# Patient Record
Sex: MALE | Race: WHITE | Employment: FULL TIME
[De-identification: names, ages, dates, MRNs, and addresses within clinical notes are randomized per-mention and may not be internally consistent; named-entity substitution may affect disease eponyms.]

---

## 2018-10-10 ENCOUNTER — HOSPITAL ENCOUNTER (OUTPATIENT)
Dept: PHYSICAL THERAPY | Facility: CLINIC | Age: 33
Setting detail: THERAPIES SERIES
Discharge: HOME | End: 2018-10-10
Payer: COMMERCIAL

## 2018-10-10 PROCEDURE — 97140 MANUAL THERAPY 1/> REGIONS: CPT

## 2018-10-10 PROCEDURE — G0283 ELEC STIM OTHER THAN WOUND: HCPCS

## 2018-10-10 PROCEDURE — 97161 PT EVAL LOW COMPLEX 20 MIN: CPT

## 2018-10-10 NOTE — CONSULTS
reports history of back giving out ~1 year ago, but pain resided with use of medication and stretching regimen. PMHx: [x] Other: Asthma              [x] Refer to full medical chart  In EPIC   Tests: None    Medications: [x] Other: Ibuprofen   Allergies:      [x] Refer to full medical record [x] Other: Bee stings    Function:  Hand Dominance  [x] Right    Working: [x] Light Duty  Return to work: TBD   Job/ADL Description: [employer]     Pain:  [x] Yes  [] No Location: L side lower thoracic/upper lumbar region  Pain Rating: (0-10 scale) 3-4/10  Pain altered Tx:  [x] Yes  [] No  Action: Ended session with HP/stim due to increased irritability of pain with evaluation    Symptoms:  [x] Improving   Better:  [x] Other: Ibuprofen, slight stretching  Worse:[x] PM  [x] Bend  [x] Other: leaning in opposite direction of source of pain   Sleep: [x] OK        Objective:   STRENGTH  ROM    Left Right Lumbar    L1-2 Hip Flex 5 5 Flexion 84   Hip Abd 5 5 Extension 20   L3-4 Knee Ext 5 5 Rotation L 10% limited  R 25% limited*   L4 Ankle DF 5 5 Sidebend L 22 R 15*   L5 EHL 5 5     S1 Plant. Flex 5 5     *= provoked L sided back pain    TESTS (+/-) LEFT RIGHT Not Tested   SLR [] sit [x] supine + for LB pain + for LB pain []   Hamstring (SLR)   []   SKTC + for L LB pain + for L LB pain []   DKTC - - []   Slump/Dural - - []   SI JT - - []   Primo Tests ? Pain ?  Pain No Change Not Tested   RFIS [] [] [x] []   JM [] [] [x] []   RFIL [x] [] [] []   REIL [x] [] [] []   Rep Prot [] [] [] [x]   Rep Retract [] [] [] [x]       OBSERVATION No Deficit Deficit Not Tested Comments   Posture       Forward Head [] [x] []    Rounded Shoulders [] [x] []    Kyphosis [] [x] [] Increased kyphosis of lumbar spine   Lordosis [x] [] []    Lateral Shift [x] [] []    Scoliosis [x] [] []    Iliac Crest [x] [] []    PSIS [x] [] []    ASIS [] [] [x]    Genu Valgus [x] [] []    Genu Varus [x] [] []    Genu Recurvatum [x] [] []    Pronation [] [] [x] Supination [] [] [x]    Leg Length Discrp [] [] [x]    Slumped Sitting [] [x] [] Moderate slumped sitting posture demonstrated throughout the evaluation; patient unable to sit in static positioning for >20-30 seconds at a time   Palpation [] [x] [] TTP at bilat lower thoracic and upper lumbar paraspinal, palpable knot felt in R lower thoracic paraspinals    Sensation [x] [] []    Edema [x] [] []    Neurological [x] [] []          FUNCTION Normal Difficult Unable   Sitting [] [] []   Standing [] [x] []   Ambulation [] [x] []   Groom/Dress [] [x] []   Lift/Carry [] [x] []   Stairs [x] [] []   Bending [] [x] []   OH reach [] [x] []   Sit to Stand [] [x] []     Comments: 26% perceived deficit per Oswestry    Assessment:  Problems:    [x] ? Back Pain: 3-4/10 thoracic and lumbar spine pain     [x] ? ROM: increased pain and limited AROM with R lumbar rotation and sidebending    [x] ? Strength: decreased core stabilization strength   [x] ? Function: 26% perceived deficit per Oswestry; light duty at work  [x] Postural Deviations: slumped sitting, sacral sitting  [x] Other: TTP at lower thoracic and lumbar paraspinals, trigger point in thoracic R paraspinals     LTG: (to be met in 9 treatments)  1. ? Pain: Patient will report pain at or below 1/10 with all active lumbar ROM to demonstrate improved tolerance to functional mobility. 2. ? ROM: Patient will demonstrate WNL lumbar ROM to allow for improved ability to perform ADLs and occupational requirements with decreased pain. 3. ? Strength: Patient will demonstrate 5/5 strength in abdominal and erector spinae musculature to allow for improved core stabilization. 4. ? 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   5. Patient will demonstrate ability to perform 5 squats with proper body mechanics to decrease stress on lumbar patient noting decreased pain and stiffness after manual work    Specific Instructions for next treatment: MFR to thoracic and lumbar paraspinals, lumbar AROM/PROM, core stabilization exercises as tolerated       Evaluation Complexity:  History (Personal factors, comorbidities) [] 0 [x] 1-2 [] 3+   Exam (limitations, restrictions) [] 1-2 [x] 3 [] 4+   Clinical presentation (progression) [x] Stable [] Evolving  [] Unstable   Decision Making [x] Low [] Moderate [] High    [x] Low Complexity [] Moderate Complexity [] High Complexity       Treatment Charges: Mins Units   [x] Evaluation       [x]  Low       []  Moderate       []  High 25 1   [x]  Modalities: HP/stim 15 1   []  Ther Exercise     [x]  Manual Therapy 15 1   []  Ther Activities     []  Aquatics     []  Vasocompression     []  Other       TOTAL TREATMENT TIME: 55    Time in: 2:45 pm    Time out: 3:45 pm    Electronically signed by: Israel Herrera PT        Physician Signature:________________________________Date:__________________  By signing above or cosigning this note, I have reviewed this plan of care and certify a need for medically necessary rehabilitation services.      *PLEASE SIGN ABOVE AND FAX BACK ALL PAGES*

## 2018-10-12 ENCOUNTER — HOSPITAL ENCOUNTER (OUTPATIENT)
Dept: PHYSICAL THERAPY | Facility: CLINIC | Age: 33
Setting detail: THERAPIES SERIES
Discharge: HOME | End: 2018-10-12
Payer: COMMERCIAL

## 2018-10-12 PROCEDURE — 97110 THERAPEUTIC EXERCISES: CPT

## 2018-10-15 ENCOUNTER — HOSPITAL ENCOUNTER (OUTPATIENT)
Dept: PHYSICAL THERAPY | Facility: CLINIC | Age: 33
Setting detail: THERAPIES SERIES
Discharge: HOME | End: 2018-10-15
Payer: COMMERCIAL

## 2018-10-15 PROCEDURE — 97110 THERAPEUTIC EXERCISES: CPT

## 2018-10-17 ENCOUNTER — HOSPITAL ENCOUNTER (OUTPATIENT)
Dept: PHYSICAL THERAPY | Facility: CLINIC | Age: 33
Setting detail: THERAPIES SERIES
Discharge: HOME | End: 2018-10-17
Payer: COMMERCIAL

## 2018-10-17 PROCEDURE — 97110 THERAPEUTIC EXERCISES: CPT

## 2018-10-19 ENCOUNTER — HOSPITAL ENCOUNTER (OUTPATIENT)
Dept: PHYSICAL THERAPY | Facility: CLINIC | Age: 33
Setting detail: THERAPIES SERIES
Discharge: HOME | End: 2018-10-19
Payer: COMMERCIAL

## 2018-10-19 PROCEDURE — 97110 THERAPEUTIC EXERCISES: CPT

## 2018-10-22 ENCOUNTER — HOSPITAL ENCOUNTER (OUTPATIENT)
Dept: PHYSICAL THERAPY | Facility: CLINIC | Age: 33
Setting detail: THERAPIES SERIES
Discharge: HOME | End: 2018-10-22
Payer: COMMERCIAL

## 2018-10-22 PROCEDURE — 97110 THERAPEUTIC EXERCISES: CPT

## 2018-10-22 NOTE — FLOWSHEET NOTE
STANDING   Not today 10/19- re-initiate next session    Heel raises 20x     Squats  10 x2   With chair for visual cueing for technique    LTR with ball taps 10x ea     3 way hip 10x ea  Blue  Added 10/17 Chair needed for stabilization occasionally          SEATED      Thoracic extension over 1/2 foam roll 20x  Added 10/19   Trunk rotation over 1/2 foam roll  20x   \"   Other:    NOT TODAY: MFR x15 minutes to bilateral thoracic and lumbar paraspinals and QL with moderate pressure, large palpable trigger point noted in R lower thoracic paraspinals- patient noting decreased pain and stiffness after manual work    Specific Instructions for next treatment: MFR to thoracic and lumbar paraspinals, lumbar AROM/PROM, core stabilization exercises as tolerated     Treatment Charges: Mins Units   []  Modalities     [x]  Ther Exercise 40 3   []  Manual Therapy     []  Ther Activities     []  Aquatics     []  Vasocompression     []  Other     Total Treatment time 40 3       Assessment: [x] Progressing toward goals. Verbal cues needed for \"rotational\" movements with D1/D2 theraband. Patient would tend to bend at the waist instead of rotate. [] No change. [] Other:     LTG: (to be met in 9 treatments)  1. ? Pain: Patient will report pain at or below 1/10 with all active lumbar ROM to demonstrate improved tolerance to functional mobility. 2. ? ROM: Patient will demonstrate WNL lumbar ROM to allow for improved ability to perform ADLs and occupational requirements with decreased pain. 3. ? Strength: Patient will demonstrate 5/5 strength in abdominal and erector spinae musculature to allow for improved core stabilization. 4. ? 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   5. Patient will demonstrate ability to perform 5 squats with proper body mechanics to decrease stress on lumbar spine and decreased risk for reinjury. 6. Patient will report score of 10% or less perceived deficit per Oswestry to demonstrate improved overall function. 7. Independent with Home Exercise Programs  8. Demonstrate Knowledge of fall prevention        Patient goals: \"feel better, get back to work; hopefully strengthen my back so it doesn't happen again\"     Pt. Education:  [] Yes  [x] No  [] Reviewed Prior HEP/Ed  Method of Education: [] Verbal  [] Demo  [] Written  10/12/18: LTR, pelvic tilts, SLR, SKTC, and TrA walkouts  Comprehension of Education:  [] Verbalizes understanding. [] Demonstrates understanding. [] Needs review. [x] Demonstrates/verbalizes HEP/Ed previously given. Plan: [x] Continue per plan of care.    [] Other:      Time In: 11:10 am          Time Out: 12:02 pm    Electronically signed by:  Megan Solis PTA

## 2018-10-24 ENCOUNTER — HOSPITAL ENCOUNTER (OUTPATIENT)
Dept: PHYSICAL THERAPY | Facility: CLINIC | Age: 33
Setting detail: THERAPIES SERIES
Discharge: HOME | End: 2018-10-24
Payer: COMMERCIAL

## 2018-10-24 PROCEDURE — 97110 THERAPEUTIC EXERCISES: CPT

## 2018-10-26 ENCOUNTER — HOSPITAL ENCOUNTER (OUTPATIENT)
Dept: PHYSICAL THERAPY | Facility: CLINIC | Age: 33
Setting detail: THERAPIES SERIES
Discharge: HOME | End: 2018-10-26
Payer: COMMERCIAL

## 2018-10-26 PROCEDURE — G0283 ELEC STIM OTHER THAN WOUND: HCPCS

## 2018-10-26 PROCEDURE — 97110 THERAPEUTIC EXERCISES: CPT

## 2018-10-26 NOTE — FLOWSHEET NOTE
[] Marisol Duran       Outpatient Physical        Therapy       [address]       Phone: [phone]       Fax: [phone] [x] [facility] at [address]       Phone: [phone]       Fax: [phone] [] [place] [place] [address] [facility]  [address] [place] [address]   Phone: [phone]   Fax:  [phone]     Physical Therapy Daily Treatment Note    Date:  10/26/2018  Patient Name:  Ta Maradiaga    :  1985  MRN: [id number]  Physician: Jean-Pierre Patel MD              Insurance: Worker's comp  Medical Diagnosis: Lumbar ligament sprain  Rehab Codes: S33. 5XXA, R29.3, R20.2, M54.6, M54.5  Onset Date: 18   Visit# / total visits:   Cancels/No Shows: 0/0    Subjective:    Pain:  [x] Yes  [] No Location: L lower back Pain Rating: (0-10 scale) 1-2/10  Pain altered Tx:  [x] No  [] Yes  Action:    Comments: Patient reports he continues to feel better since starting PT and reports no new symptoms or pain.         Objective:  Modalities: HP/IFC stim x15 minutes at end of session with patient in prone at end of evaluation  Precautions:  Exercises: Bold completed 10/26/18  Exercise Reps/ Time Weight/ Level Comments    NuStep  10' Lvl 4            SUPINE       LTR 5\" x10x ea      SKTC 12x ea  Using belt    DTKC  20x Blue SB     Bridges 15x   Added 10/19    Pelvic tilts 20x 5\"      TrA SLR 20x ea 2#  increased reps 10/22    TrA marches  20x ea 2# increased reps 10/22    TrA walkouts  20x ea 2# increased reps 10/22    Hamstring stretching 30\" x3 ea  Using belt    LE drops knees bend With ball 15x   Added 10/17    Tband shoulder ext supine 20x Blue Added 10/22/18    Tband shoulder flexion supine 20x blue Added 10/22/18                  THERABAND       Shoulder extension 20x Blue      Shoulder flexion 20x Blue      Scapular retraction  20x Blue      PNF D1 flexion 15x Red Added 10/19    PNF D2 flexion 15x Red \"

## 2018-10-31 ENCOUNTER — HOSPITAL ENCOUNTER (OUTPATIENT)
Dept: PHYSICAL THERAPY | Facility: CLINIC | Age: 33
Setting detail: THERAPIES SERIES
Discharge: HOME | End: 2018-10-31
Payer: COMMERCIAL

## 2018-10-31 PROCEDURE — 97110 THERAPEUTIC EXERCISES: CPT

## 2018-10-31 NOTE — DISCHARGE SUMMARY
10/31/18  2. ? ROM: Patient will demonstrate WNL lumbar ROM to allow for improved ability to perform ADLs and occupational requirements with decreased pain. -MET 10/31/18, see above  3. ? Strength: Patient will demonstrate 5/5 strength in abdominal and erector spinae musculature to allow for improved core stabilization. -MET 10/31/18  4. ? 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 -MET 10/31/18, patient able to sit for up to 1.5-2 hours without discomfort, discomfort relieved with HEP stretching    5. Patient will demonstrate ability to perform 5 squats with proper body mechanics to decrease stress on lumbar spine and decreased risk for reinjury. -MET 10/31/18, able to maintain neutral spinal alignment without cueing  6. Patient will report score of 10% or less perceived deficit per Oswestry to demonstrate improved overall function. -MET 10/31/18, see above      7. Independent with Home Exercise Programs-MET 10/31/18  8. Demonstrate Knowledge of fall prevention- not indicated     Patient goals: \"feel better, get back to work; hopefully strengthen my back so it doesn't happen again\"       Treatment to Date:  [x] Therapeutic Exercise    [x] Modalities:  [] Therapeutic Activity    [] Ultrasound  [x] Electrical Stimulation  [] Gait Training     [] Massage       [] Lumbar/Cervical Traction  [] Neuromuscular Re-education [x] Cold/hotpack [] Iontophoresis: 4 mg/mL  [x] Instruction in Home Exercise Program                     Dexamethasone Sodium  [x] Manual Therapy             Phosphate 40-80 mAmin  [] Aquatic Therapy                   [] Vasocompression/    [] Other:             Game Ready    Discharge Status:     [x] Pt recovered from conditions. Treatment goals were met. [x] Pt received maximum benefit. No further therapy indicated at this time.     [x] Pt to continue exercise/home instructions independently. [] Therapy interrupted due to:    [] Pt has 2 or more no shows/cancels, is discontinued per our policy. [] Pt has completed prescribed number of treatment sessions. [] Other:         Electronically signed by Martina Hernandez PT on 10/31/2018 at 12:54 PM      If you have any questions or concerns, please don't hesitate to call.   Thank you for your referral.

## 2021-04-11 ENCOUNTER — HOSPITAL ENCOUNTER (EMERGENCY)
Age: 36
Discharge: HOME | End: 2021-04-11
Attending: EMERGENCY MEDICINE
Payer: MEDICAID

## 2021-04-11 VITALS
DIASTOLIC BLOOD PRESSURE: 101 MMHG | RESPIRATION RATE: 12 BRPM | OXYGEN SATURATION: 97 % | WEIGHT: 315 LBS | BODY MASS INDEX: 38.36 KG/M2 | SYSTOLIC BLOOD PRESSURE: 144 MMHG | HEIGHT: 76 IN | HEART RATE: 94 BPM | TEMPERATURE: 98.6 F

## 2021-04-11 DIAGNOSIS — K08.89 DENTALGIA: ICD-10-CM

## 2021-04-11 DIAGNOSIS — K02.9 DENTAL CARIES: Primary | ICD-10-CM

## 2021-04-11 PROCEDURE — 6370000000 HC RX 637 (ALT 250 FOR IP): Performed by: EMERGENCY MEDICINE

## 2021-04-11 PROCEDURE — 96372 THER/PROPH/DIAG INJ SC/IM: CPT

## 2021-04-11 PROCEDURE — 6360000002 HC RX W HCPCS: Performed by: EMERGENCY MEDICINE

## 2021-04-11 PROCEDURE — 99284 EMERGENCY DEPT VISIT MOD MDM: CPT

## 2021-04-11 RX ORDER — PENICILLIN V POTASSIUM 250 MG/1
500 TABLET ORAL ONCE
Status: COMPLETED | OUTPATIENT
Start: 2021-04-11 | End: 2021-04-11

## 2021-04-11 RX ORDER — IBUPROFEN 600 MG/1
600 TABLET ORAL 3 TIMES DAILY PRN
Qty: 20 TABLET | Refills: 0 | Status: SHIPPED | OUTPATIENT
Start: 2021-04-11

## 2021-04-11 RX ORDER — PENICILLIN V POTASSIUM 500 MG/1
500 TABLET ORAL 4 TIMES DAILY
Qty: 28 TABLET | Refills: 0 | Status: SHIPPED | OUTPATIENT
Start: 2021-04-11 | End: 2021-04-18

## 2021-04-11 RX ORDER — KETOROLAC TROMETHAMINE 30 MG/ML
30 INJECTION, SOLUTION INTRAMUSCULAR; INTRAVENOUS ONCE
Status: COMPLETED | OUTPATIENT
Start: 2021-04-11 | End: 2021-04-11

## 2021-04-11 RX ORDER — ACETAMINOPHEN 325 MG/1
650 TABLET ORAL EVERY 6 HOURS PRN
COMMUNITY

## 2021-04-11 RX ADMIN — KETOROLAC TROMETHAMINE 30 MG: 30 INJECTION, SOLUTION INTRAMUSCULAR; INTRAVENOUS at 13:46

## 2021-04-11 RX ADMIN — PENICILLIN V POTASSIUM 500 MG: 250 TABLET, FILM COATED ORAL at 13:46

## 2021-04-11 ASSESSMENT — ENCOUNTER SYMPTOMS
SHORTNESS OF BREATH: 0
BACK PAIN: 0
COLOR CHANGE: 0
ABDOMINAL PAIN: 0
EYE PAIN: 0

## 2021-04-11 ASSESSMENT — PAIN SCALES - GENERAL
PAINLEVEL_OUTOF10: 10
PAINLEVEL_OUTOF10: 10

## 2021-04-11 ASSESSMENT — PAIN DESCRIPTION - LOCATION: LOCATION: TEETH
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
## 2021-04-14 ENCOUNTER — APPOINTMENT (OUTPATIENT)
Dept: GENERAL RADIOLOGY | Age: 36
End: 2021-04-14
Payer: MEDICAID

## 2021-04-14 ENCOUNTER — HOSPITAL ENCOUNTER (EMERGENCY)
Age: 36
Discharge: HOME | End: 2021-04-14
Attending: STUDENT IN AN ORGANIZED HEALTH CARE EDUCATION/TRAINING PROGRAM
Payer: MEDICAID

## 2021-04-14 VITALS
HEART RATE: 87 BPM | WEIGHT: 315 LBS | OXYGEN SATURATION: 99 % | RESPIRATION RATE: 18 BRPM | HEIGHT: 76 IN | DIASTOLIC BLOOD PRESSURE: 92 MMHG | TEMPERATURE: 98 F | SYSTOLIC BLOOD PRESSURE: 156 MMHG | BODY MASS INDEX: 38.36 KG/M2
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
## 2021-04-14 DIAGNOSIS — S39.012A BACK STRAIN, INITIAL ENCOUNTER: Primary | ICD-10-CM

## 2021-04-14 PROCEDURE — 72100 X-RAY EXAM L-S SPINE 2/3 VWS: CPT

## 2021-04-14 PROCEDURE — 99283 EMERGENCY DEPT VISIT LOW MDM: CPT

## 2021-04-14 PROCEDURE — 72072 X-RAY EXAM THORAC SPINE 3VWS: CPT

## 2021-04-14 RX ORDER — IBUPROFEN 800 MG/1
800 TABLET ORAL EVERY 8 HOURS PRN
Qty: 20 TABLET | Refills: 0 | Status: SHIPPED | OUTPATIENT
Start: 2021-04-14

## 2021-04-14 RX ORDER — CYCLOBENZAPRINE HCL 10 MG
10 TABLET ORAL NIGHTLY PRN
Qty: 10 TABLET | Refills: 0 | Status: SHIPPED | OUTPATIENT
Start: 2021-04-14 | End: 2021-04-24

## 2021-04-14 ASSESSMENT — PAIN SCALES - GENERAL: PAINLEVEL_OUTOF10: 6

## 2021-04-14 ASSESSMENT — PAIN DESCRIPTION - PAIN TYPE: TYPE: ACUTE PAIN

## 2021-04-14 ASSESSMENT — PAIN DESCRIPTION - LOCATION: LOCATION: BACK
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
